# Patient Record
Sex: FEMALE | URBAN - METROPOLITAN AREA
[De-identification: names, ages, dates, MRNs, and addresses within clinical notes are randomized per-mention and may not be internally consistent; named-entity substitution may affect disease eponyms.]

---

## 2020-09-15 ENCOUNTER — APPOINTMENT (OUTPATIENT)
Age: 45
Setting detail: DERMATOLOGY
End: 2020-09-16

## 2020-09-15 DIAGNOSIS — L98.8 OTHER SPECIFIED DISORDERS OF THE SKIN AND SUBCUTANEOUS TISSUE: ICD-10-CM

## 2020-09-15 PROCEDURE — OTHER COUNSELING: OTHER

## 2020-09-15 PROCEDURE — 99201: CPT

## 2020-09-15 PROCEDURE — OTHER MEDICATION COUNSELING: OTHER

## 2020-09-15 PROCEDURE — OTHER SEPARATE AND IDENTIFIABLE DOCUMENTATION: OTHER

## 2020-09-15 ASSESSMENT — LOCATION SIMPLE DESCRIPTION DERM: LOCATION SIMPLE: LEFT CHEEK

## 2020-09-15 ASSESSMENT — LOCATION ZONE DERM: LOCATION ZONE: FACE

## 2020-09-15 ASSESSMENT — LOCATION DETAILED DESCRIPTION DERM: LOCATION DETAILED: LEFT CENTRAL MALAR CHEEK

## 2020-09-15 NOTE — HPI: MEDICATION
Since Your Last Visit, How Has Your Condition Changed?: much better
What Is The Medication?: Tretinoin .05% cream

## 2020-09-15 NOTE — PROCEDURE: MEDICATION COUNSELING
37.4 SSKI Counseling:  I discussed with the patient the risks of SSKI including but not limited to thyroid abnormalities, metallic taste, GI upset, fever, headache, acne, arthralgias, paraesthesias, lymphadenopathy, easy bleeding, arrhythmias, and allergic reaction.

## 2020-09-16 ENCOUNTER — RX ONLY (RX ONLY)
Age: 45
End: 2020-09-16

## 2020-09-16 RX ORDER — TRETIONIN 0.5 MG/G
CREAM TOPICAL
Qty: 1 | Refills: 3 | Status: ERX | COMMUNITY
Start: 2020-09-16

## 2021-09-27 ENCOUNTER — RX ONLY (RX ONLY)
Age: 46
End: 2021-09-27

## 2021-09-27 ENCOUNTER — OTHER (OUTPATIENT)
Dept: URBAN - METROPOLITAN AREA CLINIC 19 | Facility: CLINIC | Age: 46
Setting detail: DERMATOLOGY
End: 2021-09-27

## 2021-09-27 DIAGNOSIS — L82.0 INFLAMED SEBORRHEIC KERATOSIS: ICD-10-CM

## 2021-09-27 PROCEDURE — 99212 OFFICE O/P EST SF 10 MIN: CPT

## 2021-09-27 RX ORDER — DOXYCYCLINE 100 MG/1
1 TABLET TABLET, FILM COATED ORAL TWICE A DAY
Qty: 20 | Refills: 0
Start: 2021-09-27

## 2021-09-27 RX ORDER — MUPIROCIN 20 MG/G
OINTMENT TOPICAL TWICE A DAY
Qty: 22 | Refills: 0
Start: 2021-09-27

## 2022-01-24 ENCOUNTER — SPOT (OUTPATIENT)
Dept: URBAN - METROPOLITAN AREA CLINIC 19 | Facility: CLINIC | Age: 47
Setting detail: DERMATOLOGY
End: 2022-01-24

## 2022-01-24 DIAGNOSIS — L81.4 OTHER MELANIN HYPERPIGMENTATION: ICD-10-CM

## 2022-01-24 DIAGNOSIS — L82.1 OTHER SEBORRHEIC KERATOSIS: ICD-10-CM

## 2022-01-24 DIAGNOSIS — L57.3 POIKILODERMA OF CIVATTE: ICD-10-CM

## 2022-01-24 DIAGNOSIS — I78.9 DISEASE OF CAPILLARIES, UNSPECIFIED: ICD-10-CM

## 2022-01-24 PROBLEM — L73.9 FOLLICULAR DISORDER, UNSPECIFIED: Status: RESOLVED | Noted: 2022-01-24

## 2022-01-24 PROCEDURE — 99213 OFFICE O/P EST LOW 20 MIN: CPT

## 2022-01-24 PROCEDURE — 17110 DESTRUCT B9 LESION 1-14: CPT

## 2022-01-24 RX ORDER — ADAPALENE 3 MG/G
1 A SMALL AMOUNT GEL TOPICAL EVERY NIGHT
Qty: 45 | Refills: 3
Start: 2022-01-24

## 2022-02-11 ENCOUNTER — RX ONLY (RX ONLY)
Age: 47
End: 2022-02-11

## 2022-02-11 RX ORDER — ADAPALENE 3 MG/G
1 A SMALL AMOUNT GEL TOPICAL EVERY NIGHT
Qty: 45 | Refills: 3
Start: 2022-02-11

## 2022-08-09 NOTE — PROCEDURE: MEDICATION COUNSELING
Impression: Ptosis of eyelid: H02.409. Plan: Exam demonstrates dermatochalasis with ptosis. This may be starting to become visually significant as patient is manually trying to raise her eyelids. Will refer for evaluation. Doxycycline Pregnancy And Lactation Text: This medication is Pregnancy Category D and not consider safe during pregnancy. It is also excreted in breast milk but is considered safe for shorter treatment courses.